# Patient Record
(demographics unavailable — no encounter records)

---

## 2025-07-17 NOTE — CONSULT LETTER
[Dear  ___] : Dear  [unfilled], [Courtesy Letter:] : I had the pleasure of seeing your patient, [unfilled], in my office today. [Please see my note below.] : Please see my note below. [FreeTextEntry3] : Sincerely,\par  \par  Ryan Alexander M.D.\par

## 2025-07-17 NOTE — HISTORY OF PRESENT ILLNESS
[Home] : at home, [unfilled] , at the time of the visit. [Medical Office: ([facility])___] : at the medical office located in  [Verbal consent obtained from patient] : the patient, [unfilled] [FreeTextEntry1] : 7/16/25 april developed left foot numbness (sural area)- after wearing tight ski boots  no low lukas pain numbness not worsened with exercise  balance and strength are okay   Taking B-complex  11/19/24 Micah is here in follow-up.  He is doing well overall.  No leg weakness.  Balance is stable.  Occasionally he will get some tingling.  No significant changes to health history.   5/15/24 Tele f/u Rare tingling in feet - not daily rare burning in right foot balance and strength are stable  last a1c was 5.5   2/27/24 Micah is here in follow-up.  Last treatment was January 4 and 5.  He was unable to get the treatment at the end of January because of his wife's surgery.  He has not had worsening of his symptoms since not getting the treatment.  He will still have some mild tingling at night.  Strength is normal.  Balance is normal.  Unfortunately with Octagam about a week and a half after infusion he gets a rash which actually turns into blisters.  With each treatment it has been getting worse.  It will then last another 2 weeks until his time for the next treatment.  It is quite uncomfortable.  He has been pretty dosed with Medrol, Benadryl, and Tylenol without any improvement.  10/18/23 Noah is here in f/u. Notes symptoms have improved. They were pronounced last winter.  Last winter had difficulty with skiing. Last treatment was last week- getting home infusions.  Notes he feels "feverish" right after infusion- one to two days Premedicates Benadryl and Tylenol.  Mild rash on chest - rash fades away then, no itching.  Notes no significant hand symptoms- last winter experienced some tingling but at present no.  No leg fatigue Balance is good - at baseline   Notes burning pain in right foot (has bravo's neuroma)- worse with pressure Not taking Gabapentin at present. If on feet all day, night is worse being barefoot can help    7/19/23 Biggest complaint is post LP headache computer screen and driving worsens his headache taking gabapentin at night - helps him sleep better  Reviewed MRI Lumbar results and CSF results.    6/6/23 Telephone visit.  Noah is consistently getting pins-and-needles in hands and feet. This phone call was to review EMG and labs.  5/10/23 pins in needles in hands and feet get cold out of proportion to stimulus  occasionally symptoms are in the hands   pins and needles are not as bad as they were started amlodipine  no weakness and no balance   still has occasional paresthesias  less intense  exaggerated cold response which is new   A1C persistently high   OTC b1 b12 mag oxide d3   he does drink regularly  since a1c high he has cut back quite a bit  instead of 2-3 drinks at dinner trying not to drink on most days   HPI - Neyda Waters NP. 12/9/22 Patient is a 61yo male with history of HTN, HLD, GERD, and BPH who presents with bilateral lower extremity tingling.    Symptoms began in the middle of October.  They were present for a few weeks and then remitted and have returned since that time.  The symptoms occur mostly at night and can happen during the day as well.  It was worse a few weeks ago compared to this time.  He thought that he may have had some symptoms in his fingertips as well but he thinks that it may have been related to stress/anxiety.  He reports that it is pin-prick tingles and a cold sensation.  He has had pin-prick sensation that began in the toes and then has gone to his foot to his heel.  It began in his left foot.  He has differing symptoms in his right foot related to neuroma.  He feels that he has patchy sensation.  He feels it is better when he sleeps well and sometimes advil will help.  He feels that it is worse when he doesn't sleep well.  He has had problems with sleep related to this problem.  The tactile stimulation seems to be aggravated by external stimulation.  He denies any weakness, change in vision/speech/swallowing, back problems.  He reported that he was outside in the cold for a number of hours and felt that it took many hours for them to get better.  Symptoms from the neuroma he describes a hot knife, jammed into a nerve.  This is more of an annoying pain, less debilitating.  He has loss of sensation in the third and fourth toe following surgery from Bravo's neuroma.  He had a PCP visit one month ago and everything was good at that time.

## 2025-07-17 NOTE — REVIEW OF SYSTEMS
[Numbness] : numbness [Tingling] : tingling [Negative] : ENT [Confused or Disoriented] : no confusion [Memory Lapses or Loss] : no memory loss [Decr. Concentrating Ability] : no decrease in concentrating ability [Difficulty with Language] : no ~M difficulty with language [Changed Thought Patterns] : no change in thought patterns [Repeating Questions] : no repeated questioning about recent events [Facial Weakness] : no facial weakness [Arm Weakness] : no arm weakness [Hand Weakness] : no hand weakness [Leg Weakness] : no leg weakness [Poor Coordination] : good coordination [Difficulty Writing] : no difficulty writing [Difficulties in Speech] : no speech difficulties [Abnormal Sensation] : no abnormal sensation [Hypersensitivity] : no hypersensitivity [Seizures] : no convulsions [Dizziness] : no dizziness [Fainting] : no fainting [Lightheadedness] : no lightheadedness [Vertigo] : no vertigo [Cluster Headache] : no cluster headache [Migraine Headache] : no migraine headache [Tension Headache] : no tension-type headache [Difficulty Walking] : no difficulty walking [Inability to Walk] : able to walk [Ataxia] : no ataxia [Frequent Falls] : not falling [Limping] : not limping [Shortness Of Breath] : no shortness of breath [Abdominal Pain] : no abdominal pain [Dysuria] : no dysuria [Arthralgias] : no arthralgias [Joint Pain] : no joint pain [FreeTextEntry9] : negative back pain

## 2025-07-17 NOTE — HISTORY OF PRESENT ILLNESS
[Home] : at home, [unfilled] , at the time of the visit. [Medical Office: ([facility])___] : at the medical office located in  [Verbal consent obtained from patient] : the patient, [unfilled] [FreeTextEntry1] : 7/16/25 april developed left foot numbness (sural area)- after wearing tight ski boots  no low lukas pain numbness not worsened with exercise  balance and strength are okay   Taking B-complex  11/19/24 Micah is here in follow-up.  He is doing well overall.  No leg weakness.  Balance is stable.  Occasionally he will get some tingling.  No significant changes to health history.   5/15/24 Tele f/u Rare tingling in feet - not daily rare burning in right foot balance and strength are stable  last a1c was 5.5   2/27/24 Micah is here in follow-up.  Last treatment was January 4 and 5.  He was unable to get the treatment at the end of January because of his wife's surgery.  He has not had worsening of his symptoms since not getting the treatment.  He will still have some mild tingling at night.  Strength is normal.  Balance is normal.  Unfortunately with Octagam about a week and a half after infusion he gets a rash which actually turns into blisters.  With each treatment it has been getting worse.  It will then last another 2 weeks until his time for the next treatment.  It is quite uncomfortable.  He has been pretty dosed with Medrol, Benadryl, and Tylenol without any improvement.  10/18/23 Noah is here in f/u. Notes symptoms have improved. They were pronounced last winter.  Last winter had difficulty with skiing. Last treatment was last week- getting home infusions.  Notes he feels "feverish" right after infusion- one to two days Premedicates Benadryl and Tylenol.  Mild rash on chest - rash fades away then, no itching.  Notes no significant hand symptoms- last winter experienced some tingling but at present no.  No leg fatigue Balance is good - at baseline   Notes burning pain in right foot (has bravo's neuroma)- worse with pressure Not taking Gabapentin at present. If on feet all day, night is worse being barefoot can help    7/19/23 Biggest complaint is post LP headache computer screen and driving worsens his headache taking gabapentin at night - helps him sleep better  Reviewed MRI Lumbar results and CSF results.    6/6/23 Telephone visit.  Noah is consistently getting pins-and-needles in hands and feet. This phone call was to review EMG and labs.  5/10/23 pins in needles in hands and feet get cold out of proportion to stimulus  occasionally symptoms are in the hands   pins and needles are not as bad as they were started amlodipine  no weakness and no balance   still has occasional paresthesias  less intense  exaggerated cold response which is new   A1C persistently high   OTC b1 b12 mag oxide d3   he does drink regularly  since a1c high he has cut back quite a bit  instead of 2-3 drinks at dinner trying not to drink on most days   HPI - Neyda Waters NP. 12/9/22 Patient is a 61yo male with history of HTN, HLD, GERD, and BPH who presents with bilateral lower extremity tingling.    Symptoms began in the middle of October.  They were present for a few weeks and then remitted and have returned since that time.  The symptoms occur mostly at night and can happen during the day as well.  It was worse a few weeks ago compared to this time.  He thought that he may have had some symptoms in his fingertips as well but he thinks that it may have been related to stress/anxiety.  He reports that it is pin-prick tingles and a cold sensation.  He has had pin-prick sensation that began in the toes and then has gone to his foot to his heel.  It began in his left foot.  He has differing symptoms in his right foot related to neuroma.  He feels that he has patchy sensation.  He feels it is better when he sleeps well and sometimes advil will help.  He feels that it is worse when he doesn't sleep well.  He has had problems with sleep related to this problem.  The tactile stimulation seems to be aggravated by external stimulation.  He denies any weakness, change in vision/speech/swallowing, back problems.  He reported that he was outside in the cold for a number of hours and felt that it took many hours for them to get better.  Symptoms from the neuroma he describes a hot knife, jammed into a nerve.  This is more of an annoying pain, less debilitating.  He has loss of sensation in the third and fourth toe following surgery from Bravo's neuroma.  He had a PCP visit one month ago and everything was good at that time.

## 2025-07-17 NOTE — ASSESSMENT
[FreeTextEntry1] : History of CIDP treated with IVIG.  Symptomatically he is stable and improved.  EMG showed improvement.   Advised to stop B complex (due to excess B6)  Fu in one year.

## 2025-07-17 NOTE — PHYSICAL EXAM
[Affect] : the affect was normal [Motor Tone] : muscle tone was normal in all four extremities [Motor Strength] : muscle strength was normal in all four extremities [No Muscle Atrophy] : normal bulk in all four extremities [5] : ankle plantar flexion 5/5 [Neck Cervical Mass (___cm)] : no neck mass was observed [Hand Weakness Right] : normal hand  [Hand Weakness Left] : normal hand  [Past-pointing] : there was no past-pointing [Tremor] : no tremor present [Coordination - Dysmetria Impaired Finger-to-Nose Bilateral] : not present [Plantar Reflex Right Only] : normal on the right [Plantar Reflex Left Only] : normal on the left [FreeTextEntry1] : High arches bilaterally.  Calf hypertrophy and small muscle belly of distal calf, unclear if related to sport